# Patient Record
Sex: MALE | Race: WHITE
[De-identification: names, ages, dates, MRNs, and addresses within clinical notes are randomized per-mention and may not be internally consistent; named-entity substitution may affect disease eponyms.]

---

## 2018-04-14 ENCOUNTER — HOSPITAL ENCOUNTER (EMERGENCY)
Dept: HOSPITAL 74 - JER | Age: 28
Discharge: HOME | End: 2018-04-14
Payer: SELF-PAY

## 2018-04-14 VITALS — HEART RATE: 84 BPM | TEMPERATURE: 98.2 F | SYSTOLIC BLOOD PRESSURE: 156 MMHG | DIASTOLIC BLOOD PRESSURE: 57 MMHG

## 2018-04-14 VITALS — BODY MASS INDEX: 32.1 KG/M2

## 2018-04-14 DIAGNOSIS — R10.84: Primary | ICD-10-CM

## 2018-04-14 DIAGNOSIS — R11.2: ICD-10-CM

## 2018-04-14 NOTE — PDOC
Attending Attestation





- Resident


Resident Name: Leah Weinstein





- ED Attending Attestation


I have performed the following: I have examined & evaluated the patient, The 

case was reviewed & discussed with the resident, I agree w/resident's findings 

& plan





- HPI


HPI: 





04/14/18 20:49


Pt ate chipotle this afternoon for lunch and 5 hrs later he vomted everything 

up. He states that he felt unwell after eating his meal.  He has no fever and 

no chills.  He has no vomiting at this time.  





- Physicial Exam


PE: 





04/14/18 20:51


His abd is slighly "unsettled" but no tenderness and no gassiness. 


He has no flank pain


Pt has no pharyngitis.


He has no fever.


Pt appears well.


He is not tachycardic.





- Medical Decision Making





04/16/18 04:43


Pt feeling well; home with oral hydration

## 2018-04-14 NOTE — PDOC
History of Present Illness





- General


Chief Complaint: Nausea/Vomiting


Stated Complaint: STOMACH PAIN


Time Seen by Provider: 04/14/18 20:26


History Source: Patient





- History of Present Illness


Initial Comments: 





04/15/18 13:17


27 year old male with no reported PMH presented to ED c/o vomiting.  Patient 

states he has vomiting 3-4 times without any fevers/chills 





without abdominal pain, fevers/chills, diarrhea/constipation.  




















Pt ate chipotle this afternoon for lunch and 5 hrs later he vomted everything 

up. He states that he felt unwell after eating his meal.  He has no fever and 

no chills.  He has no vomiting at this time.  





- Physicial Exam


PE: 





04/14/18 20:51


His abd is slighly "unsettled" but no tenderness and no gassiness. 


He has no flank pain


Pt has no pharyngitis.


He has no fever.


Pt appears well.


He is not tachycardic.





























04/15/18 13:24





04/15/18 23:30








Past History





- Past Medical History


Allergies/Adverse Reactions: 


 Allergies











Allergy/AdvReac Type Severity Reaction Status Date / Time


 


No Known Allergies Allergy   Verified 04/14/18 20:21











Home Medications: 


Ambulatory Orders





No Home Medications 0 dose .ROUTE UTDICT 09/26/12 











- Immunization History


Immunization Up to Date: Yes





- Suicide/Smoking/Psychosocial Hx


Smoking Status: No


Smoking History: Never smoked


Have you smoked in the past 12 months: No


Number of Cigarettes Smoked Daily: 0


Information on smoking cessation initiated: No


Hx Alcohol Use: No


Drug/Substance Use Hx: No





*Physical Exam





- Vital Signs


 Last Vital Signs











Temp Pulse Resp BP Pulse Ox


 


 98.2 F   84   16   156/57   100 


 


 04/14/18 20:19  04/14/18 20:19  04/14/18 20:19  04/14/18 20:19  04/14/18 20:19














Medical Decision Making





- Medical Decision Making





04/14/18 21:20


27 year old male 





*DC/Admit/Observation/Transfer


Diagnosis at time of Disposition: 


 Nausea








- Discharge Dispostion


Disposition: HOME


Condition at time of disposition: Good


Admit: No





- Referrals





- Patient Instructions


Printed Discharge Instructions:  DI for Nausea -- Adult


Additional Instructions: 


You can take Pepcid and Maalox for relief.  





Follow up with your primary care doctor in the next 48 hours.  We have provided 

a referral should you need a doctor to establish primary care. 





Return to the Emergency Department for any new/worsening/concerning symptoms. 





- Post Discharge Activity

## 2018-05-14 ENCOUNTER — HOSPITAL ENCOUNTER (EMERGENCY)
Dept: HOSPITAL 74 - JERFT | Age: 28
Discharge: HOME | End: 2018-05-14
Payer: SELF-PAY

## 2018-05-14 VITALS — HEART RATE: 69 BPM | SYSTOLIC BLOOD PRESSURE: 146 MMHG | DIASTOLIC BLOOD PRESSURE: 75 MMHG | TEMPERATURE: 98.3 F

## 2018-05-14 VITALS — BODY MASS INDEX: 34.2 KG/M2
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
## 2018-05-14 DIAGNOSIS — S83.511A: Primary | ICD-10-CM
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
## 2018-05-14 PROCEDURE — 2W3QXYZ IMMOBILIZATION OF RIGHT LOWER LEG USING OTHER DEVICE: ICD-10-PCS

## 2018-05-14 NOTE — PDOC
Rapid Medical Evaluation


Time Seen by Provider: 05/14/18 19:15


Medical Evaluation: 


 Allergies











Allergy/AdvReac Type Severity Reaction Status Date / Time


 


No Known Allergies Allergy   Verified 04/14/18 20:21











05/14/18 19:15


I have performed a brief in-person evaluation of this patient.





The patient presents with a chief complaint of: left knee pain s/p slip and 

fall down 2 concrete steps





Pertinent physical exam findings: suprapatellar swelling present





I have ordered the following: xray





The patient will proceed to the ED for further evaluation.





**Discharge Disposition





- Diagnosis


 Knee pain, left








- Referrals





- Patient Instructions





- Post Discharge Activity

## 2018-05-14 NOTE — PDOC
History of Present Illness





- General


Chief Complaint: Pain, Acute


Stated Complaint: LEG INJURY


Time Seen by Provider: 05/14/18 19:15





- History of Present Illness


Initial Comments: 


28-year-old male presents for evaluation after left knee injury. He states he 

fell down some steps about 4 days ago. He points to the anterior lateral aspect 

of the left knee as the area of discomfort. He states when he fell he heard a 

snap. He complains of instability. No prior problems with the left knee


05/14/18 20:39








Past History





- Past Medical History


Allergies/Adverse Reactions: 


 Allergies











Allergy/AdvReac Type Severity Reaction Status Date / Time


 


No Known Allergies Allergy   Verified 05/14/18 19:21











Home Medications: 


Ambulatory Orders





No Home Medications 0 dose .ROUTE UTDICT 09/26/12 








COPD: No





- Immunization History


Immunization Up to Date: Yes





- Suicide/Smoking/Psychosocial Hx


Smoking Status: No


Smoking History: Never smoked


Have you smoked in the past 12 months: No


Number of Cigarettes Smoked Daily: 0


Hx Alcohol Use: No


Drug/Substance Use Hx: No


Substance Use Type: None





**Review of Systems





- Review of Systems


Musculoskeletal: Yes: Joint Pain


All Other Systems: Reviewed and Negative





*Physical Exam





- Vital Signs


 Last Vital Signs











Temp Pulse Resp BP Pulse Ox


 


 98.3 F   69   18   146/75   98 


 


 05/14/18 19:21  05/14/18 19:21  05/14/18 19:21  05/14/18 19:21  05/14/18 19:21














- Physical Exam


Comments: 


Left knee skin color and temperature are normal range of motion is 0 30 beyond 

that causes pain. He has a small intra-articular effusion. Positive lateral 

joint line tenderness. He has a positive Lachman's test without an endpoint. No 

that rash or valgus instability. Thigh and calf Is soft and nontender he has no 

gross sensorimotor deficits is neurovascular intact.


05/14/18 20:40








*DC/Admit/Observation/Transfer


Diagnosis at time of Disposition: 


 Knee pain, left, ACL (anterior cruciate ligament) rupture








- Discharge Dispostion


Disposition: HOME


Condition at time of disposition: Stable


Decision to Admit order: No





- Referrals


Referrals: 


Bonilla Crane MD [Staff Physician] - 





- Patient Instructions


Printed Discharge Instructions:  DI for Anterior Cruciate Ligament Injury


Additional Instructions: 


It is very important fetus follow-up with orthopedic surgeon within the next 1-

2 days. The injury he sustained a require operative repair. He may wear the 

knee immobilizer as her mobilizing with the use of crutches and weight-bear as 

tolerated with use of crutches. You may remove the knee immobilizer while at 

home and for hygiene. It is important that he get any as straight as possible 

and try to work on your range of motion of your knee throughout the day. Again 

you only need to knee immobilizer agger walking around the use of crutches for 

stability. He may take Tylenol for pain only.





- Post Discharge Activity

## 2018-06-02 ENCOUNTER — HOSPITAL ENCOUNTER (EMERGENCY)
Dept: HOSPITAL 74 - JERFT | Age: 28
Discharge: HOME | End: 2018-06-02
Payer: SELF-PAY

## 2018-06-02 VITALS — HEART RATE: 56 BPM | DIASTOLIC BLOOD PRESSURE: 85 MMHG | SYSTOLIC BLOOD PRESSURE: 140 MMHG | TEMPERATURE: 98.1 F

## 2018-06-02 VITALS — BODY MASS INDEX: 34.8 KG/M2

## 2018-06-02 DIAGNOSIS — M94.0: Primary | ICD-10-CM

## 2018-06-02 NOTE — PDOC
History of Present Illness





- General


Chief Complaint: Shortness of Breath


Stated Complaint: SHORTNESS OF BREATH


Time Seen by Provider: 06/02/18 22:38





- History of Present Illness


Initial Comments: 


28-year-old healthy male presents for evaluation of cough with associated chest 

pain when he coughs 2 days. He has no fever his cough is unproductive he has 

no other associated symptoms.


06/02/18 22:44








Past History





- Past Medical History


Allergies/Adverse Reactions: 


 Allergies











Allergy/AdvReac Type Severity Reaction Status Date / Time


 


No Known Allergies Allergy   Verified 06/02/18 20:37











Home Medications: 


Ambulatory Orders





No Home Medications 0 dose .ROUTE UTDICT 09/26/12 








COPD: No





- Immunization History


Immunization Up to Date: Yes





- Suicide/Smoking/Psychosocial Hx


Smoking Status: No


Smoking History: Current some day smoker


Have you smoked in the past 12 months: No


Number of Cigarettes Smoked Daily: 1


Information on smoking cessation initiated: Yes


'Breaking Loose' booklet given: 06/02/18


Hx Alcohol Use: No


Drug/Substance Use Hx: No


Substance Use Type: None





**Review of Systems





- Review of Systems


Comments:: 


GENERAL/CONSTITUTIONAL: No fever or chills. No weakness. No weight change.


HEAD, EYES, EARS, NOSE AND THROAT: No change in vision. No ear pain or 

discharge. No sore throat.


CARDIOVASCULAR: + chest pain or shortness of breath.


RESPIRATORY: + cough, no wheezing, or hemoptysis.


GASTROINTESTINAL: No nausea, vomiting, diarrhea or constipation. No rectal 

bleeding.


GENITOURINARY: No dysuria, frequency, or change in urination.


MUSCULOSKELETAL: No joint or muscle swelling or pain. No neck or back pain.


SKIN AND BREASTS: No rash or easy bruising.


NEUROLOGIC: No headache, vertigo, loss of consciousness, or loss of sensation.


PSYCHIATRIC: No depression or anxiety.


ENDOCRINE: No increased thirst. No abnormal weight change.


HEMATOLOGIC/LYMPHATIC: No anemia, easy bleeding, or history of blood clots.


ALLERGIC/IMMUNOLOGIC: No hives or skin allergy. No latex allergy.


06/02/18 22:45








*Physical Exam





- Vital Signs


 Last Vital Signs











Temp Pulse Resp BP Pulse Ox


 


 98.1 F   56 L  18   140/85   99 


 


 06/02/18 20:34  06/02/18 20:34  06/02/18 20:34  06/02/18 20:34  06/02/18 20:34














- Physical Exam


Comments: 


GENERAL: The patient is awake, alert, and fully oriented, in no acute distress.


HEAD: Normal with no signs of trauma.


EYES: Pupils equal, round and reactive to light, extraocular movements intact, 

sclera anicteric, conjunctiva clear.


ENT: Ears normal, nares patent, oropharynx clear without exudates.  Moist 

mucous membranes.


NECK: Normal range of motion, supple without lymphadenopathy, JVD, or masses.


LUNGS: Breath sounds equal, clear to auscultation bilaterally.  No wheezes, and 

no crackles.


HEART: Regular rate and rhythm, normal S1 and S2 without murmur, rub or gallop. 

he has reproducible p the right and left ostochondral junctions.


ABDOMEN: Soft, nontender, normoactive bowel sounds.  No guarding, no rebound.  

No masses.


EXTREMITIES: Normal range of motion, no edema.  No clubbing or cyanosis. No 

cords, erythema, or tenderness.


NEUROLOGICAL: Cranial nerves II through XII grossly intact.  Normal speech, 

normal gait.


PSYCH: Normal mood, normal affect.


SKIN: Warm, Dry, normal turgor, no rashes or lesions noted.


06/02/18 22:45








Medical Decision Making





- Medical Decision Making


This is a healthy 28-year-old male with no other comorbidities presenting with 

a cough and is very reproducible chest pain. This may be an early bronchitis 

however his exam is benign. I've advised him on Motrin and Tylenol and close 

follow-up with his PCP or return to the ER if symptoms do not resolve.


06/02/18 22:46








*DC/Admit/Observation/Transfer


Diagnosis at time of Disposition: 


 Costochondral chest pain








- Discharge Dispostion


Disposition: HOME


Condition at time of disposition: Stable


Decision to Admit order: No





- Referrals


Referrals: 


Ethan Diallo MD [Staff Physician] - 





- Patient Instructions


Printed Discharge Instructions:  Costochondritis, DI for Costochondritis


Additional Instructions: 


Return to the emergency room if your symptoms worsen or go unresolved in the 

next 2-3 days. In the meantime treat your pain with Tylenol and Motrin. If your 

cough is bothering you at night he may take Robitussin. In the meantime he 

should follow up with the primary care provider I've recommended for a within 

next 2-3 days.





- Post Discharge Activity